# Patient Record
Sex: MALE | Race: WHITE | NOT HISPANIC OR LATINO | URBAN - METROPOLITAN AREA
[De-identification: names, ages, dates, MRNs, and addresses within clinical notes are randomized per-mention and may not be internally consistent; named-entity substitution may affect disease eponyms.]

---

## 2018-08-24 ENCOUNTER — OFFICE VISIT - HEALTHEAST (OUTPATIENT)
Dept: FAMILY MEDICINE | Facility: CLINIC | Age: 58
End: 2018-08-24

## 2018-08-24 DIAGNOSIS — Z13.21 SCREENING FOR ENDOCRINE, NUTRITIONAL, METABOLIC AND IMMUNITY DISORDER: ICD-10-CM

## 2018-08-24 DIAGNOSIS — Z13.0 SCREENING FOR ENDOCRINE, NUTRITIONAL, METABOLIC AND IMMUNITY DISORDER: ICD-10-CM

## 2018-08-24 DIAGNOSIS — Z12.5 SCREENING FOR PROSTATE CANCER: ICD-10-CM

## 2018-08-24 DIAGNOSIS — Z13.228 SCREENING FOR ENDOCRINE, NUTRITIONAL, METABOLIC AND IMMUNITY DISORDER: ICD-10-CM

## 2018-08-24 DIAGNOSIS — R73.01 ELEVATED FASTING GLUCOSE: ICD-10-CM

## 2018-08-24 DIAGNOSIS — Z13.29 SCREENING FOR ENDOCRINE, NUTRITIONAL, METABOLIC AND IMMUNITY DISORDER: ICD-10-CM

## 2018-08-24 DIAGNOSIS — Z00.00 ROUTINE GENERAL MEDICAL EXAMINATION AT A HEALTH CARE FACILITY: ICD-10-CM

## 2018-08-24 DIAGNOSIS — Z12.11 SCREEN FOR COLON CANCER: ICD-10-CM

## 2018-08-24 DIAGNOSIS — Z13.220 SCREENING FOR LIPID DISORDERS: ICD-10-CM

## 2018-08-24 DIAGNOSIS — Z86.0100 HISTORY OF COLONIC POLYPS: ICD-10-CM

## 2018-08-24 DIAGNOSIS — L98.9 SKIN LESION: ICD-10-CM

## 2018-08-24 LAB
ANION GAP SERPL CALCULATED.3IONS-SCNC: 10 MMOL/L (ref 5–18)
BUN SERPL-MCNC: 23 MG/DL (ref 8–22)
CALCIUM SERPL-MCNC: 9.4 MG/DL (ref 8.5–10.5)
CHLORIDE BLD-SCNC: 105 MMOL/L (ref 98–107)
CHOLEST SERPL-MCNC: 225 MG/DL
CO2 SERPL-SCNC: 24 MMOL/L (ref 22–31)
CREAT SERPL-MCNC: 0.84 MG/DL (ref 0.7–1.3)
FASTING STATUS PATIENT QL REPORTED: NO
GFR SERPL CREATININE-BSD FRML MDRD: >60 ML/MIN/1.73M2
GLUCOSE BLD-MCNC: 92 MG/DL (ref 70–125)
HBA1C MFR BLD: 5.6 % (ref 3.5–6)
HDLC SERPL-MCNC: 58 MG/DL
LDLC SERPL CALC-MCNC: 152 MG/DL
LDLC SERPL CALC-MCNC: 163 MG/DL
POTASSIUM BLD-SCNC: 4.2 MMOL/L (ref 3.5–5)
PSA SERPL-MCNC: 0.8 NG/ML (ref 0–3.5)
SODIUM SERPL-SCNC: 139 MMOL/L (ref 136–145)
TRIGL SERPL-MCNC: 75 MG/DL

## 2018-08-24 ASSESSMENT — MIFFLIN-ST. JEOR: SCORE: 1680.15

## 2018-08-24 NOTE — ASSESSMENT & PLAN NOTE
This patient is establishing care today.  He has a lesion on his scrotum is his main health concern.  He describes himself as a healthy person who eats well.  -Mild obesity without complication.  Screen for diabetes.  Screen for cholesterol abnormalities.  -Prostate cancer screening: The patient and I had a lengthy conversation about the pros and cons of screening for prostate cancer with a PSA.  We will compare his test results with his results from 2 years ago.

## 2018-08-24 NOTE — ASSESSMENT & PLAN NOTE
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  Given that it has been stable in size in texture for greater than 1 year, and recommending active surveillance.  If he is interestedin pathology, I would refer to either urology or dermatology to obtain a biopsy given his location.

## 2018-08-27 LAB
25(OH)D3 SERPL-MCNC: 50.4 NG/ML (ref 30–80)
25(OH)D3 SERPL-MCNC: 50.4 NG/ML (ref 30–80)

## 2018-10-08 ENCOUNTER — RECORDS - HEALTHEAST (OUTPATIENT)
Dept: ADMINISTRATIVE | Facility: OTHER | Age: 58
End: 2018-10-08

## 2018-10-09 ENCOUNTER — RECORDS - HEALTHEAST (OUTPATIENT)
Dept: ADMINISTRATIVE | Facility: OTHER | Age: 58
End: 2018-10-09

## 2021-06-01 VITALS — HEIGHT: 68 IN | BODY MASS INDEX: 29.86 KG/M2 | WEIGHT: 197 LBS

## 2021-06-13 ENCOUNTER — HEALTH MAINTENANCE LETTER (OUTPATIENT)
Age: 61
End: 2021-06-13

## 2021-06-16 PROBLEM — L98.9 SKIN LESION: Status: ACTIVE | Noted: 2018-08-25

## 2021-06-16 PROBLEM — Z86.0100 HISTORY OF COLONIC POLYPS: Status: ACTIVE | Noted: 2018-08-24

## 2021-06-16 PROBLEM — E66.811 CLASS 1 OBESITY WITHOUT SERIOUS COMORBIDITY WITH BODY MASS INDEX (BMI) OF 30.0 TO 30.9 IN ADULT, UNSPECIFIED OBESITY TYPE: Status: ACTIVE | Noted: 2018-08-24

## 2021-06-16 PROBLEM — Z00.00 ROUTINE GENERAL MEDICAL EXAMINATION AT A HEALTH CARE FACILITY: Status: ACTIVE | Noted: 2018-08-24

## 2021-06-20 NOTE — PROGRESS NOTES
MALE PREVENTATIVE EXAM    Assessment and Plan:       Problem List Items Addressed This Visit     Routine general medical examination at a health care facility - Primary     This patient is establishing care today.  He has a lesion on his scrotum is his main health concern.  He describes himself as a healthy person who eats well.  -Mild obesity without complication.  Screen for diabetes.  Screen for cholesterol abnormalities.  -Prostate cancer screening: The patient and I had a lengthy conversation about the pros and cons of screening for prostate cancer with a PSA.  We will compare his test results with his results from 2 years ago.         Relevant Orders    Lipid Cascade (Completed)    Basic Metabolic Panel (Completed)    Glycosylated Hemoglobin A1c (Completed)    PSA (Prostatic-Specific Antigen), Annual Screen (Completed)    Vitamin D, Total (25-Hydroxy)    LDL Cholesterol, Direct (Completed)    History of colonic polyps     Referral placed for repeat colonoscopy.         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  Given that it has been stable in size in texture for greater than 1 year, and recommending active surveillance.  If he is interested in pathology, I would refer to either urology or dermatology to obtain a biopsy given his location.           Other Visit Diagnoses     Screen for colon cancer        Relevant Orders    Ambulatory referral for Colonoscopy    Screening for lipid disorders        Relevant Orders    Lipid Cascade (Completed)    Elevated fasting glucose        Relevant Orders    Basic Metabolic Panel (Completed)    Glycosylated Hemoglobin A1c (Completed)    Screening for endocrine, nutritional, metabolic and immunity disorder        Relevant Orders    Vitamin D, Total (25-Hydroxy)    Screening for prostate cancer        Relevant Orders    PSA (Prostatic-Specific Antigen), Annual Screen (Completed)            Next follow up:  No Follow-up on  file.    Immunization Review  Adult Imm Review: No immunizations due today  Pt does not use tobacco     I discussed the following with the patient:   Adult Healthy Living: Importance of regular exercise  Healthy nutrition  Getting adequate sleep    I have had an Advance Directives discussion with the patient.    Subjective:   Chief Complaint: Yaya Lu is an 58 y.o. male here for a preventative health visit.     HPI:  Skin lesion on scrotum.  No pain.  No drainage.  No change in size over 18 month time frame.  The patient does not attribute this lesion to any sexually activity.  He is sexually active with his wife.  The patient does not believe he has been exposed to sexually transmitted illnesses.  No family history of skin cancer.  No personal history of skin cancer.  The patient has no other similar lesions.  No trauma.    Healthy Habits  Are you taking a daily aspirin? No  Do you typically exercising at least 40 min, 3-4 times per week?  Yes, wife is a nutritionist.  He does the [employer].  He stays active with yard work.    Do you usually eat at least 4 servings of fruit and vegetables a day, include whole grains and fiber and avoid regularly eating high fat foods? Yes  Have you had an eye exam in the past two years? Yes  Do you see a dentist twice per year? NO  Do you have any concerns regarding sleep? No    Safety Screen  If you own firearms, are they secured in a locked gun cabinet or with trigger locks? Yes  Do you feel you are safe where you are living?: Yes (8/24/2018  1:06 PM)  Do you feel you are safe in your relationship(s)?: Yes (8/24/2018  1:06 PM)    Review of Systems:  Please see above.  The rest of the review of systems are negative for all systems.     Cancer Screening       Status Date      COLONOSCOPY Postponed 10/6/2018 Originally 8/23/2012. Temporary Medical Deferral        Patient Care Team:  Tomer Hunter MD as PCP - General (Family Medicine)    History     Reviewed By  "Date/Time Sections Reviewed    Tomer Hunter MD 8/25/2018  2:03 PM Medical, Surgical, Tobacco, Alcohol, Drug Use, Sexual Activity, Family    Tomer Hunter MD 8/24/2018  1:18 PM Tobacco, Alcohol, Drug Use, Sexual Activity    Tomer Hunter MD 8/24/2018  1:16 PM Surgical    Tomer Hunter MD 8/24/2018  1:15 PM Medical    Krista DHALIWAL Uriel LPN 8/24/2018  1:06 PM Tobacco    Krista DHALIWAL Uriel, LPN 8/24/2018  1:05 PM Tobacco    Krista DHALIWAL Uriel, LPN 8/24/2018  1:03 PM Family    Krista DHALIWAL Uriel LPN 8/24/2018  1:02 PM Family    Krista DHALIWAL Uriel LPN 8/24/2018  1:01 PM Surgical, Tobacco, Alcohol, Drug Use, Sexual Activity, Family    Krista DHALIWAL Uriel LPSUMIT 8/24/2018  1:00 PM Tobacco, Alcohol, Drug Use, Sexual Activity            Objective:   Vital Signs:   Visit Vitals     /74 (Patient Site: Left Arm, Patient Position: Sitting, Cuff Size: Adult Large)     Pulse 68     Temp 98.6  F (37  C) (Oral)     Resp 20     Ht 5' 7.5\" (1.715 m)     Wt 197 lb (89.4 kg)     SpO2 99%  Comment: room air     BMI 30.4 kg/m2          PHYSICAL EXAM  Physical Exam   Constitutional: He is oriented to person, place, and time. He appears well-developed. No distress.   HENT:   Head: Normocephalic and atraumatic.   Right Ear: External ear normal.   Left Ear: External ear normal.   Nose: Nose normal.   Mouth/Throat: Oropharynx is clear and moist. No oropharyngeal exudate.   Eyes: Conjunctivae and EOM are normal. Pupils are equal, round, and reactive to light. Right eye exhibits no discharge. Left eye exhibits no discharge. No scleral icterus.   Neck: Normal range of motion. No thyromegaly present.   Cardiovascular: Normal rate, regular rhythm, normal heart sounds and intact distal pulses.  Exam reveals no gallop and no friction rub.    No murmur heard.  Pulmonary/Chest: Effort normal and breath sounds normal. No respiratory distress. He has no wheezes.   Abdominal: Soft. He exhibits no distension " and no mass. There is no tenderness.   Genitourinary: Testes normal and penis normal.   Genitourinary Comments: On the underside this patient scrotum, there is a 6 mm in diameter yellow circular raised lesion that is firm to palpation.  There are no similar lesions in the adjacent area.  No fluctuance.   Musculoskeletal: Normal range of motion. He exhibits no edema.   Lymphadenopathy:     He has no cervical adenopathy.   Neurological: He is alert and oriented to person, place, and time. He has normal reflexes. No cranial nerve deficit. He exhibits normal muscle tone.   Skin: Skin is warm.   Psychiatric: He has a normal mood and affect. His behavior is normal. Judgment and thought content normal.   Vitals reviewed.    The 10-year ASCVD risk score ([place] ORLIN Jr, et al., 2013) is: 5.8%    Values used to calculate the score:      Age: 58 years      Sex: Male      Is Non- : No      Diabetic: No      Tobacco smoker: No      Systolic Blood Pressure: 124 mmHg      Is BP treated: No      HDL Cholesterol: 58 mg/dL      Total Cholesterol: 225 mg/dL         Medication List      Notice  As of 8/24/2018 11:59 PM    You have not been prescribed any medications.          Additional Screenings Completed Today:

## 2021-10-03 ENCOUNTER — HEALTH MAINTENANCE LETTER (OUTPATIENT)
Age: 61
End: 2021-10-03

## 2022-07-10 ENCOUNTER — HEALTH MAINTENANCE LETTER (OUTPATIENT)
Age: 62
End: 2022-07-10

## 2022-09-10 ENCOUNTER — HEALTH MAINTENANCE LETTER (OUTPATIENT)
Age: 62
End: 2022-09-10

## 2023-07-23 ENCOUNTER — HEALTH MAINTENANCE LETTER (OUTPATIENT)
Age: 63
End: 2023-07-23